# Patient Record
Sex: FEMALE | Race: BLACK OR AFRICAN AMERICAN
[De-identification: names, ages, dates, MRNs, and addresses within clinical notes are randomized per-mention and may not be internally consistent; named-entity substitution may affect disease eponyms.]

---

## 2020-07-30 ENCOUNTER — APPOINTMENT (OUTPATIENT)
Dept: FAMILY MEDICINE | Facility: CLINIC | Age: 50
End: 2020-07-30

## 2021-05-12 ENCOUNTER — OUTPATIENT (OUTPATIENT)
Dept: OUTPATIENT SERVICES | Facility: HOSPITAL | Age: 51
LOS: 1 days | Discharge: HOME | End: 2021-05-12

## 2023-05-09 ENCOUNTER — APPOINTMENT (OUTPATIENT)
Dept: OBGYN | Facility: CLINIC | Age: 53
End: 2023-05-09

## 2023-06-06 ENCOUNTER — APPOINTMENT (OUTPATIENT)
Dept: OBGYN | Facility: CLINIC | Age: 53
End: 2023-06-06
Payer: COMMERCIAL

## 2023-06-06 VITALS
BODY MASS INDEX: 37.93 KG/M2 | HEIGHT: 66 IN | WEIGHT: 236 LBS | SYSTOLIC BLOOD PRESSURE: 130 MMHG | DIASTOLIC BLOOD PRESSURE: 85 MMHG

## 2023-06-06 DIAGNOSIS — Z00.00 ENCOUNTER FOR GENERAL ADULT MEDICAL EXAMINATION W/OUT ABNORMAL FINDINGS: ICD-10-CM

## 2023-06-06 DIAGNOSIS — Z82.49 FAMILY HISTORY OF ISCHEMIC HEART DISEASE AND OTHER DISEASES OF THE CIRCULATORY SYSTEM: ICD-10-CM

## 2023-06-06 DIAGNOSIS — Z78.9 OTHER SPECIFIED HEALTH STATUS: ICD-10-CM

## 2023-06-06 LAB
DATE COLLECTED: NORMAL
HEMOCCULT SP1 STL QL: NEGATIVE
QUALITY CONTROL: YES

## 2023-06-06 PROCEDURE — 99386 PREV VISIT NEW AGE 40-64: CPT

## 2023-06-06 PROCEDURE — 82270 OCCULT BLOOD FECES: CPT

## 2023-06-07 ENCOUNTER — TRANSCRIPTION ENCOUNTER (OUTPATIENT)
Age: 53
End: 2023-06-07

## 2023-06-09 LAB — HPV HIGH+LOW RISK DNA PNL CVX: NOT DETECTED

## 2023-06-12 DIAGNOSIS — B96.89 ACUTE VAGINITIS: ICD-10-CM

## 2023-06-12 DIAGNOSIS — N76.0 ACUTE VAGINITIS: ICD-10-CM

## 2023-06-12 RX ORDER — METRONIDAZOLE 7.5 MG/G
0.75 GEL VAGINAL
Qty: 1 | Refills: 0 | Status: ACTIVE | COMMUNITY
Start: 2023-06-12

## 2023-06-14 LAB — CYTOLOGY CVX/VAG DOC THIN PREP: ABNORMAL

## 2023-06-14 NOTE — DISCUSSION/SUMMARY
[FreeTextEntry1] : 51yo G0 PMF with unremarkable annual exam \par \par RHM: \par - DVS neg x 3\par - Dentist, PCP, Derm as discussed \par - Rx given for DEXA, mammo/sono\par - Colonoscopy as discussed -- pt due 2025.  Discussed importance of following up for colon ca screening. She verbalized understanding. \par - Offered STI screen today. Pt declined\par - Encouraged healthy diet, exercise, weight loss\par \par Mariam Rehman MD \par Obstetrician/Gynecologist\par

## 2023-06-14 NOTE — HISTORY OF PRESENT ILLNESS
[Patient reported mammogram was normal] : Patient reported mammogram was normal [Patient reported PAP Smear was normal] : Patient reported PAP Smear was normal [Patient reported colonoscopy was normal] : Patient reported colonoscopy was normal [FreeTextEntry1] : 51yo G0 PMF is here to establish care & for annual exam. no GYN complaints.  [Mammogramdate] : 2020 [PapSmeardate] : 2019 [BoneDensityDate] : NEVER [ColonoscopyDate] : [facility] 2022

## 2024-02-20 ENCOUNTER — OFFICE (OUTPATIENT)
Dept: URBAN - METROPOLITAN AREA CLINIC 38 | Facility: CLINIC | Age: 54
Setting detail: OPHTHALMOLOGY
End: 2024-02-20
Payer: COMMERCIAL

## 2024-02-20 DIAGNOSIS — H01.001: ICD-10-CM

## 2024-02-20 DIAGNOSIS — H16.223: ICD-10-CM

## 2024-02-20 DIAGNOSIS — H01.004: ICD-10-CM

## 2024-02-20 PROCEDURE — 92004 COMPRE OPH EXAM NEW PT 1/>: CPT | Performed by: OPHTHALMOLOGY

## 2024-02-20 ASSESSMENT — REFRACTION_MANIFEST
OD_SPHERE: +0.50
OS_CYLINDER: SPH
OD_ADD: +2.00
OS_VA1: 20/20
OU_VA: 20/20
OS_ADD: +2.00
OS_SPHERE: +0.50
OS_VA2: 20/20
OD_CYLINDER: SPH
OD_VA2: 20/20
OD_VA1: 20/20

## 2024-02-20 ASSESSMENT — REFRACTION_AUTOREFRACTION
OS_SPHERE: PLANO
OS_CYLINDER: -0.75
OD_AXIS: 093
OS_AXIS: 094
OD_SPHERE: +0.50
OD_CYLINDER: -0.50

## 2024-02-20 ASSESSMENT — SUPERFICIAL PUNCTATE KERATITIS (SPK)
OS_SPK: 2+
OD_SPK: 2+

## 2024-02-20 ASSESSMENT — CONFRONTATIONAL VISUAL FIELD TEST (CVF)
OD_FINDINGS: FULL
OS_FINDINGS: FULL

## 2024-02-20 ASSESSMENT — LID EXAM ASSESSMENTS
OD_BLEPHARITIS: RUL
OD_COMMENTS: EUL- NO FB
OS_BLEPHARITIS: LUL
OS_COMMENTS: EUL- NO FB

## 2024-02-20 ASSESSMENT — SPHEQUIV_DERIVED: OD_SPHEQUIV: 0.25

## 2024-08-15 PROBLEM — Z01.419 ENCOUNTER FOR WELL WOMAN EXAM WITH ROUTINE GYNECOLOGICAL EXAM: Status: ACTIVE | Noted: 2024-08-15

## 2024-08-19 ENCOUNTER — APPOINTMENT (OUTPATIENT)
Dept: OBGYN | Facility: CLINIC | Age: 54
End: 2024-08-19

## 2024-08-19 ENCOUNTER — APPOINTMENT (OUTPATIENT)
Dept: OBGYN | Facility: CLINIC | Age: 54
End: 2024-08-19
Payer: COMMERCIAL

## 2024-08-19 VITALS
DIASTOLIC BLOOD PRESSURE: 76 MMHG | BODY MASS INDEX: 37.93 KG/M2 | SYSTOLIC BLOOD PRESSURE: 114 MMHG | WEIGHT: 236 LBS | HEIGHT: 66 IN

## 2024-08-19 DIAGNOSIS — Z01.419 ENCOUNTER FOR GYNECOLOGICAL EXAMINATION (GENERAL) (ROUTINE) W/OUT ABNORMAL FINDINGS: ICD-10-CM

## 2024-08-19 DIAGNOSIS — N89.8 OTHER SPECIFIED NONINFLAMMATORY DISORDERS OF VAGINA: ICD-10-CM

## 2024-08-19 PROCEDURE — 99396 PREV VISIT EST AGE 40-64: CPT

## 2024-08-19 PROCEDURE — 99212 OFFICE O/P EST SF 10 MIN: CPT | Mod: 25

## 2024-08-20 LAB — HPV HIGH+LOW RISK DNA PNL CVX: NOT DETECTED

## 2024-08-20 NOTE — HISTORY OF PRESENT ILLNESS
[Patient reported mammogram was normal] : Patient reported mammogram was normal [Patient reported breast sonogram was normal] : Patient reported breast sonogram was normal [Patient reported PAP Smear was normal] : Patient reported PAP Smear was normal [Patient reported bone density results were normal] : Patient reported bone density results were normal [HIV test declined] : HIV test declined [Syphilis test declined] : Syphilis test declined [Gonorrhea test offered] : Gonorrhea test offered [Chlamydia test offered] : Chlamydia test offered [Trichomonas test offered] : Trichomonas test offered [HPV test offered] : HPV test offered [Hepatitis B test declined] : Hepatitis B test declined [Hepatitis C test declined] : Hepatitis C test declined [postmenopausal] : postmenopausal [Y] : Patient is sexually active [N] : Patient denies prior pregnancies [TextBox_4] : 54 yo G0 for well woman exam.  No hx abnormal pap smears, abnormal bleeding, fibroids, cysts.  No urinary complaints.  Past medical, surgical, social and family hx reviewed. [Mammogramdate] : 6/23 [BreastSonogramDate] : 6/23 [PapSmeardate] : 6/23 [BoneDensityDate] : 6/23

## 2024-08-20 NOTE — PHYSICAL EXAM
[Appropriately responsive] : appropriately responsive [Alert] : alert [No Acute Distress] : no acute distress [No Lymphadenopathy] : no lymphadenopathy [Regular Rate Rhythm] : regular rate rhythm [No Murmurs] : no murmurs [Clear to Auscultation B/L] : clear to auscultation bilaterally [Soft] : soft [Non-tender] : non-tender [Non-distended] : non-distended [No HSM] : No HSM [No Lesions] : no lesions [No Mass] : no mass [Oriented x3] : oriented x3 [Examination Of The Breasts] : a normal appearance [No Masses] : no breast masses were palpable [Labia Majora] : normal [Labia Minora] : normal [Discharge] : a  ~M vaginal discharge was present [Scant] : scant [Isac] : yellow [Normal] : normal [Uterine Adnexae] : normal [Foul Smelling] : not foul smelling

## 2024-08-20 NOTE — HISTORY OF PRESENT ILLNESS
[Patient reported mammogram was normal] : Patient reported mammogram was normal [Patient reported breast sonogram was normal] : Patient reported breast sonogram was normal [Patient reported PAP Smear was normal] : Patient reported PAP Smear was normal [Patient reported bone density results were normal] : Patient reported bone density results were normal [HIV test declined] : HIV test declined [Syphilis test declined] : Syphilis test declined [Gonorrhea test offered] : Gonorrhea test offered [Chlamydia test offered] : Chlamydia test offered [Trichomonas test offered] : Trichomonas test offered [HPV test offered] : HPV test offered [Hepatitis B test declined] : Hepatitis B test declined [Hepatitis C test declined] : Hepatitis C test declined [postmenopausal] : postmenopausal [Y] : Patient is sexually active [N] : Patient denies prior pregnancies [TextBox_4] : 52 yo G0 for well woman exam.  No hx abnormal pap smears, abnormal bleeding, fibroids, cysts.  No urinary complaints.  Past medical, surgical, social and family hx reviewed. [Mammogramdate] : 6/23 [BreastSonogramDate] : 6/23 [PapSmeardate] : 6/23 [BoneDensityDate] : 6/23

## 2024-08-20 NOTE — DISCUSSION/SUMMARY
[FreeTextEntry1] : Unremarkable CBE and SBE reviewed Mammogram ordered Pelvic exam unremarkable scan yellow discharge, not malodorous and without classic candidiasis appearance Vaginitis Plus collected and will treat for + results Pap/HPV collected Advised to use hypoallergenic soap on vulva only - patient feels she has reaction to other soaps including current Dr. Torres's soap. I reviewed practices to support bone health including Vitamin D3 (2000 IU) and calcium rich foods with limitation on calcium supplementation by tabular form to 600 MG by mouth daily, a minimum of 30 minutes of weight bearing exercise at least 3 x weekly and limited daily sun exposure, 10-15 minutes.  Healthy diet, exercise and sleep hygiene discussed The importance of a screening colonoscopy was discussed.

## 2024-08-20 NOTE — REVIEW OF SYSTEMS
[Patient Intake Form Reviewed] : Patient intake form was reviewed [FreeTextEntry8] : vulvar discomfort with soap

## 2024-08-22 ENCOUNTER — NON-APPOINTMENT (OUTPATIENT)
Age: 54
End: 2024-08-22

## 2024-08-25 PROBLEM — B37.31 CANDIDIASIS OF VAGINA: Status: ACTIVE | Noted: 2024-08-25

## 2024-08-25 PROBLEM — B37.2 CANDIDIASIS, CUTANEOUS: Status: ACTIVE | Noted: 2024-08-25

## 2024-08-25 LAB
A VAGINAE DNA VAG QL NAA+PROBE: NORMAL
BVAB2 DNA VAG QL NAA+PROBE: NORMAL
C KRUSEI DNA VAG QL NAA+PROBE: NEGATIVE
C TRACH RRNA SPEC QL NAA+PROBE: NEGATIVE
CANDIDA DNA VAG QL NAA+PROBE: NEGATIVE
CYTOLOGY CVX/VAG DOC THIN PREP: ABNORMAL
MEGA1 DNA VAG QL NAA+PROBE: NORMAL
N GONORRHOEA RRNA SPEC QL NAA+PROBE: NEGATIVE
T VAGINALIS RRNA SPEC QL NAA+PROBE: NEGATIVE

## 2024-09-03 ENCOUNTER — APPOINTMENT (OUTPATIENT)
Dept: OBGYN | Facility: CLINIC | Age: 54
End: 2024-09-03

## 2024-09-30 ENCOUNTER — NON-APPOINTMENT (OUTPATIENT)
Age: 54
End: 2024-09-30

## 2024-10-24 ENCOUNTER — APPOINTMENT (OUTPATIENT)
Dept: OBGYN | Facility: CLINIC | Age: 54
End: 2024-10-24

## 2025-01-07 ENCOUNTER — APPOINTMENT (OUTPATIENT)
Dept: OBGYN | Facility: CLINIC | Age: 55
End: 2025-01-07
Payer: COMMERCIAL

## 2025-01-07 VITALS
HEIGHT: 66 IN | WEIGHT: 236 LBS | SYSTOLIC BLOOD PRESSURE: 113 MMHG | DIASTOLIC BLOOD PRESSURE: 77 MMHG | BODY MASS INDEX: 37.93 KG/M2

## 2025-01-07 DIAGNOSIS — R10.2 PELVIC AND PERINEAL PAIN: ICD-10-CM

## 2025-01-07 LAB
APPEARANCE: CLEAR
BILIRUBIN URINE: NEGATIVE
BLOOD URINE: ABNORMAL
COLOR: YELLOW
GLUCOSE QUALITATIVE U: NEGATIVE
KETONES URINE: NEGATIVE
LEUKOCYTE ESTERASE URINE: ABNORMAL
NITRITE URINE: NEGATIVE
PH URINE: 6.5
PROTEIN URINE: NEGATIVE
SPECIFIC GRAVITY URINE: 1.02
UROBILINOGEN URINE: 0.2 (ref 0.2–?)

## 2025-01-07 PROCEDURE — 99213 OFFICE O/P EST LOW 20 MIN: CPT

## 2025-01-27 ENCOUNTER — APPOINTMENT (OUTPATIENT)
Dept: OBGYN | Facility: CLINIC | Age: 55
End: 2025-01-27

## 2025-03-22 ENCOUNTER — OFFICE (OUTPATIENT)
Dept: URBAN - METROPOLITAN AREA CLINIC 38 | Facility: CLINIC | Age: 55
Setting detail: OPHTHALMOLOGY
End: 2025-03-22
Payer: COMMERCIAL

## 2025-03-22 ENCOUNTER — RX ONLY (RX ONLY)
Age: 55
End: 2025-03-22

## 2025-03-22 DIAGNOSIS — H01.001: ICD-10-CM

## 2025-03-22 DIAGNOSIS — H16.223: ICD-10-CM

## 2025-03-22 DIAGNOSIS — H01.004: ICD-10-CM

## 2025-03-22 PROCEDURE — 92014 COMPRE OPH EXAM EST PT 1/>: CPT

## 2025-03-22 ASSESSMENT — REFRACTION_MANIFEST
OD_SPHERE: +0.50
OD_CYLINDER: SPH
OS_CYLINDER: SPH
OD_VA2: 20/20
OD_VA1: 20/20
OD_ADD: +2.00
OS_VA2: 20/20
OS_ADD: +2.00
OU_VA: 20/20
OS_VA1: 20/20
OS_SPHERE: +0.50

## 2025-03-22 ASSESSMENT — SUPERFICIAL PUNCTATE KERATITIS (SPK)
OS_SPK: 2+
OD_SPK: 2+

## 2025-03-22 ASSESSMENT — CONFRONTATIONAL VISUAL FIELD TEST (CVF)
OD_FINDINGS: FULL
OS_FINDINGS: FULL

## 2025-03-22 ASSESSMENT — LID EXAM ASSESSMENTS
OD_COMMENTS: MGD
OS_BLEPHARITIS: LUL 1+ 2+
OD_BLEPHARITIS: RUL 1+ 2+
OS_COMMENTS: MGD

## 2025-03-22 ASSESSMENT — REFRACTION_AUTOREFRACTION
OS_CYLINDER: -1.00
OS_AXIS: 096
OD_SPHERE: +0.25
OD_CYLINDER: -0.50
OS_SPHERE: 0.00
OD_AXIS: 085

## 2025-03-22 ASSESSMENT — KERATOMETRY
OS_AXISANGLE_DEGREES: 094
OD_K1POWER_DIOPTERS: 43.00
OD_AXISANGLE_DEGREES: 085
OS_K1POWER_DIOPTERS: 44.00
OD_K2POWER_DIOPTERS: 43.25
METHOD_AUTO_MANUAL: AUTO
OS_K2POWER_DIOPTERS: 44.50

## 2025-03-22 ASSESSMENT — VISUAL ACUITY
OS_BCVA: 20/20-2
OD_BCVA: 20/20-

## 2025-03-22 ASSESSMENT — TEAR BREAK UP TIME (TBUT)
OD_TBUT: 2 SECONDS
OS_TBUT: 2 SECONDS

## 2025-07-18 ENCOUNTER — OFFICE (OUTPATIENT)
Dept: URBAN - METROPOLITAN AREA CLINIC 38 | Facility: CLINIC | Age: 55
Setting detail: OPHTHALMOLOGY
End: 2025-07-18
Payer: COMMERCIAL

## 2025-07-18 DIAGNOSIS — H25.13: ICD-10-CM

## 2025-07-18 DIAGNOSIS — H35.40: ICD-10-CM

## 2025-07-18 DIAGNOSIS — H16.223: ICD-10-CM

## 2025-07-18 DIAGNOSIS — H53.10: ICD-10-CM

## 2025-07-18 DIAGNOSIS — H01.004: ICD-10-CM

## 2025-07-18 DIAGNOSIS — H01.001: ICD-10-CM

## 2025-07-18 DIAGNOSIS — H11.153: ICD-10-CM

## 2025-07-18 PROCEDURE — 92014 COMPRE OPH EXAM EST PT 1/>: CPT | Performed by: OPHTHALMOLOGY

## 2025-07-18 PROCEDURE — 92250 FUNDUS PHOTOGRAPHY W/I&R: CPT | Performed by: OPHTHALMOLOGY

## 2025-07-18 ASSESSMENT — LID EXAM ASSESSMENTS
OS_COMMENTS: MGD
OD_BLEPHARITIS: RUL 1+ 2+
OD_COMMENTS: MGD
OS_BLEPHARITIS: LUL 1+ 2+

## 2025-07-18 ASSESSMENT — TEAR BREAK UP TIME (TBUT)
OD_TBUT: 1+
OS_TBUT: 1+

## 2025-07-18 ASSESSMENT — REFRACTION_MANIFEST
OS_VA1: 20/20
OD_SPHERE: +0.50
OS_ADD: +2.00
OD_VA2: 20/20
OU_VA: 20/20
OS_VA2: 20/20
OD_CYLINDER: SPH
OS_SPHERE: +0.50
OD_ADD: +2.00
OS_CYLINDER: SPH
OD_VA1: 20/20

## 2025-07-18 ASSESSMENT — VISUAL ACUITY
OS_BCVA: 20/25-
OD_BCVA: 20/25-

## 2025-07-18 ASSESSMENT — REFRACTION_AUTOREFRACTION
OS_SPHERE: 0.00
OS_CYLINDER: -1.00
OD_AXIS: 085
OS_AXIS: 096
OD_SPHERE: +0.25
OD_CYLINDER: -0.50

## 2025-07-18 ASSESSMENT — KERATOMETRY
OD_K2POWER_DIOPTERS: 43.25
OS_K1POWER_DIOPTERS: 44.00
OS_K2POWER_DIOPTERS: 44.50
OD_AXISANGLE_DEGREES: 085
METHOD_AUTO_MANUAL: AUTO
OS_AXISANGLE_DEGREES: 094
OD_K1POWER_DIOPTERS: 43.00

## 2025-07-18 ASSESSMENT — CONFRONTATIONAL VISUAL FIELD TEST (CVF)
OD_FINDINGS: FULL
OS_FINDINGS: FULL